# Patient Record
(demographics unavailable — no encounter records)

---

## 2025-02-25 NOTE — ASSESSMENT
[FreeTextEntry1] : Avery is a 9 year 7 month old girl, with strong family history of thyroid disease, with elevated TSH levels and normal T4 from labs in 8/2024. I explained to AVERY and her parents normal thyroid physiology, as well as symptoms of hyper/hypothyroidism.  I explained that the most common cause of acquired hypothyroidism in the [place] is autoimmune thyroid disease.  I have therefore ordered repeat TSH and free T4 levels and thyroid antibodies.  Her thyroid Ab's are elevated, however TSH and free T4 normal.  She will require monitoring of TFT's every 6 months.  If TSH greater than 10 uIU/mL or free T4 less low, then she will require thyroid hormone replacement.  Follow up with endocrine as needed.

## 2025-02-25 NOTE — CONSULT LETTER
[Dear  ___] : Dear  [unfilled], [Consult Letter:] : I had the pleasure of evaluating your patient, [unfilled]. [Please see my note below.] : Please see my note below. [Consult Closing:] : Thank you very much for allowing me to participate in the care of this patient.  If you have any questions, please do not hesitate to contact me. [Sincerely,] : Sincerely, [FreeTextEntry2] : Lulu Woodall MD [FreeTextEntry3] : Brenda Carney MD

## 2025-02-25 NOTE — PHYSICAL EXAM
[Healthy Appearing] : healthy appearing [Well Nourished] : well nourished [Interactive] : interactive [Well formed] : well formed [Normally Set] : normally set [Normal S1 and S2] : normal S1 and S2 [Murmur] : no murmurs [Clear to Ausculation Bilaterally] : clear to auscultation bilaterally [Abdomen Soft] : soft [Abdomen Tenderness] : non-tender [] : no hepatosplenomegaly [1] : was Axel stage 1 [Normal Appearance] : normal in appearance [Axel Stage ___] : the Axel stage for breast development was [unfilled] [Normal] : normal

## 2025-02-25 NOTE — HISTORY OF PRESENT ILLNESS
[FreeTextEntry2] : Avery is a 9 year7 month old girl, here with her father and siblings, for concerns of thyroid function.  Labs on 8/21/24 showed TSH 5.79 uIU/mL, T4 7.1 ug/dL.  These labs were done due to family history of thyroid concerns.   Ther are no concerns about fatigue, constipation, or cold intolerance.  She has normal skin.